# Patient Record
Sex: FEMALE | Race: WHITE
[De-identification: names, ages, dates, MRNs, and addresses within clinical notes are randomized per-mention and may not be internally consistent; named-entity substitution may affect disease eponyms.]

---

## 2017-02-26 ENCOUNTER — HOSPITAL ENCOUNTER (EMERGENCY)
Dept: HOSPITAL 25 - UCCORT | Age: 22
Discharge: HOME | End: 2017-02-26
Payer: COMMERCIAL

## 2017-02-26 VITALS — DIASTOLIC BLOOD PRESSURE: 70 MMHG | SYSTOLIC BLOOD PRESSURE: 119 MMHG

## 2017-02-26 DIAGNOSIS — R53.83: ICD-10-CM

## 2017-02-26 DIAGNOSIS — R50.9: ICD-10-CM

## 2017-02-26 DIAGNOSIS — R09.81: ICD-10-CM

## 2017-02-26 DIAGNOSIS — B34.9: Primary | ICD-10-CM

## 2017-02-26 DIAGNOSIS — Z91.02: ICD-10-CM

## 2017-02-26 PROCEDURE — 99201: CPT

## 2017-02-26 PROCEDURE — G0463 HOSPITAL OUTPT CLINIC VISIT: HCPCS

## 2017-02-26 PROCEDURE — 87502 INFLUENZA DNA AMP PROBE: CPT

## 2017-02-26 PROCEDURE — 87651 STREP A DNA AMP PROBE: CPT

## 2017-02-26 NOTE — UC
FLU HPI





- HPI Summary


HPI Summary: 





Pt presents with c/o generalized body aches, cough, nasal congestion, sore 

throat , coughing up "bloody phlegm" X 3 days. 





- History of Current Complaint


Chief Complaint: UCRespiratory


Stated Complaint: ST/FEVER/HEADACHE


Time Seen by Provider: 02/26/17 08:12


Hx Obtained From: Patient


Hx Last Menstrual Period: 2.5 weeks


Pregnant?: No


Onset/Duration: Sudden Onset, Lasting Days


Severity Currently: Mild


Severity Initially: Mild


Associated Signs & Symptoms: Positive: Fever, Myalgia, Cough, Sore Throat, 

Nasal Congestion, Headache





- Risk Factors


Influenza Risk Factors: Negative





- Allergy/Home Medications


Allergies/Adverse Reactions: 


 Allergies











Allergy/AdvReac Type Severity Reaction Status Date / Time


 


gluten Allergy  Abdominal Uncoded 02/26/17 07:49





   Pain  











Home Medications: 


 Home Medications





Drospirenone-Ethinyl Estradiol [Vestura] 1 tab PO DAILY 02/26/17 [History 

Confirmed 02/26/17]


Otc Cough Med 1 dose PO ONCE PRN 02/26/17 [History]


Phenylephrine-Doxylamine-Dextr [Nyquil Severe Cold/Flu 5-6.25- mg/15Ml] 1 

liq PO BEDTIME PRN 02/26/17 [History Confirmed 02/26/17]











PMH/Surg Hx/FS Hx/Imm Hx


Previously Healthy: Yes





- Surgical History


Surgical History: Yes


Surgery Procedure, Year, and Place: wisdom teeth





- Family History


Known Family History: Positive: Other - positive [facility] for viral disease





- Social History


Occupation: Student


Alcohol Use: Occasionally


Substance Use Type: None


Smoking Status (MU): Never Smoked Tobacco





Review of Systems


Constitutional: Fever, Chills, Fatigue


Skin: Negative


Eyes: Negative


ENT: Sore Throat


Respiratory: Cough


Cardiovascular: Negative


Gastrointestinal: Negative


Genitourinary: Negative


Motor: Negative


Neurovascular: Negative


Musculoskeletal: Myalgia


Neurological: Headache


Psychological: Negative


All Other Systems Reviewed And Are Negative: Yes





Physical Exam


Triage Information Reviewed: Yes


Appearance: Ill-Appearing


Vital Signs: 


 Initial Vital Signs











Temp  98.7 F   02/26/17 07:52


 


Pulse  97   02/26/17 07:52


 


Resp  18   02/26/17 07:52


 


BP  119/70   02/26/17 07:52


 


Pulse Ox  100   02/26/17 07:52











Vital Signs Reviewed: Yes


Eye Exam: Normal


ENT Exam: Other


ENT: Positive: Tonsillar swelling


Dental Exam: Normal


Neck exam: Normal


Respiratory Exam: Normal


Cardiovascular Exam: Normal


Musculoskeletal Exam: Normal


Neurological Exam: Normal


Psychological Exam: Normal


Skin Exam: Normal





Flu Course/Dx





- Differential Dx/Diagnosis


Differential Diagnosis/HQI/PQRI: Bronchitis, Influenza, Upper Respiratory 

Infection, Other - mono,


Provider Diagnoses: Viral syndrome





Discharge





- Discharge Plan


Condition: Stable


Disposition: HOME


Patient Education Materials:  Viral Syndrome (ED)


Forms:  *School Release


Referrals: 


[facility] [place] PHYSICIAN REFERRAL [Outside]

## 2017-04-25 ENCOUNTER — HOSPITAL ENCOUNTER (EMERGENCY)
Dept: HOSPITAL 25 - UCCORT | Age: 22
Discharge: HOME | End: 2017-04-25
Payer: COMMERCIAL

## 2017-04-25 VITALS — DIASTOLIC BLOOD PRESSURE: 55 MMHG | SYSTOLIC BLOOD PRESSURE: 110 MMHG

## 2017-04-25 DIAGNOSIS — J03.00: Primary | ICD-10-CM

## 2017-04-25 PROCEDURE — 99212 OFFICE O/P EST SF 10 MIN: CPT

## 2017-04-25 PROCEDURE — G0463 HOSPITAL OUTPT CLINIC VISIT: HCPCS

## 2017-04-25 PROCEDURE — 87651 STREP A DNA AMP PROBE: CPT

## 2017-04-25 NOTE — UC
Throat Pain/Nasal Leonel HPI





- HPI Summary


HPI Summary: 





complaint of sore throat that started yetsrday


 this morning when she woke up she could hardly swallow


 has felt feverish but hasn't checked her temp


slight nasal congestion denies cough


denies headache and ear pain


took skin infection but only took 2 doses


has been taking tylenol without much relief





- History of Current Complaint


Chief Complaint: UCGeneralIllness


Stated Complaint: SWOLLEN THROAT/ST/REDNESS


Time Seen by Provider: 04/25/17 18:11


Hx Obtained From: Patient


Hx Last Menstrual Period: 3 wks ago





- Allergies/Home Medications


Allergies/Adverse Reactions: 


 Allergies











Allergy/AdvReac Type Severity Reaction Status Date / Time


 


gluten Allergy  Abdominal Uncoded 04/25/17 18:04





   Pain  











Home Medications: 


 Home Medications





ALPRAZolam TAB* [Xanax TAB*] 0.25 mg PO DAILY PRN 04/25/17 [History Confirmed 04 /25/17]


Azithromycin TAB* [Zithromax TAB (Z-TAQUERIA) 250 mg #6 tabs] 250 mg PO DAILY 04/25/ 17 [History Confirmed 04/25/17]


Lisdexamfetamine Dimesylate [Vyvanse] 20 mg PO DAILY 04/25/17 [History 

Confirmed 04/25/17]











PMH/Surg Hx/FS Hx/Imm Hx


Previously Healthy: Yes





- Surgical History


Surgical History: Yes


Surgery Procedure, Year, and Place: wisdom teeth





- Family History


Known Family History: Positive: Other - positive [facility] for viral disease


   Negative: Cardiac Disease, Hypertension, Diabetes





- Social History


Occupation: Student


Alcohol Use: Occasionally


Substance Use Type: None


Smoking Status (MU): Never Smoked Tobacco





Review of Systems


Constitutional: Negative


Skin: Negative


Eyes: Negative


ENT: Sore Throat, Nasal Discharge


Respiratory: Negative


Cardiovascular: Negative


Gastrointestinal: Negative


Genitourinary: Negative


Motor: Negative


Neurovascular: Negative


Musculoskeletal: Negative


Neurological: Negative


Psychological: Negative


All Other Systems Reviewed And Are Negative: Yes





Physical Exam


Triage Information Reviewed: Yes


Appearance: No Pain Distress, Well-Nourished


Vital Signs: 


 Initial Vital Signs











Temp  98.7 F   04/25/17 18:06


 


Pulse  78   04/25/17 18:06


 


Resp  16   04/25/17 18:06


 


BP  110/55   04/25/17 18:06


 


Pulse Ox  100   04/25/17 18:06











Vital Signs Reviewed: Yes


Eyes: Positive: Conjunctiva Clear


ENT: Positive: Pharyngeal erythema, Nasal congestion, Nasal drainage, TMs normal

, Tonsillar swelling, Tonsillar exudate


Dental: Positive: Cervical Lymphadenopathy


Respiratory: Positive: Lungs clear, Normal breath sounds, No respiratory 

distress, No accessory muscle use


Cardiovascular: Positive: RRR, No Murmur, Pulses Normal


Abdomen Description: Positive: Nontender, Soft


Bowel Sounds: Positive: Present


Musculoskeletal Exam: Normal


Neurological Exam: Normal


Psychological Exam: Normal


Skin Exam: Normal





Throat Pain/Nasal Course/Dx





- Differential Dx/Diagnosis


Differential Diagnosis/HQI/PQRI: Pharyngitis, Tonsillitis


Provider Diagnoses: tonsilitis-strep





Discharge





- Discharge Plan


Condition: Stable


Disposition: HOME


Prescriptions: 


Penicillin VK  MG(NF) [Penicillin  mg Tab(NF)] 500 mg PO TID #30 

tab


Patient Education Materials:  Strep Throat (ED)


Referrals: 


Non Staff,Doctor [Primary Care Provider] - 


Additional Instructions: 




















Please take antibiotic as directed


Increase fluids and rest


Take acetaminophen or ibuprofen for fever or pain


Please review your discharge instructions. 


If your symptoms do not improve please call your primary care provider or 

return to urgent care.

## 2017-09-28 ENCOUNTER — HOSPITAL ENCOUNTER (EMERGENCY)
Dept: HOSPITAL 25 - UCCORT | Age: 22
Discharge: HOME | End: 2017-09-28
Payer: COMMERCIAL

## 2017-09-28 VITALS — SYSTOLIC BLOOD PRESSURE: 118 MMHG | DIASTOLIC BLOOD PRESSURE: 56 MMHG

## 2017-09-28 DIAGNOSIS — H10.9: Primary | ICD-10-CM

## 2017-09-28 PROCEDURE — G0463 HOSPITAL OUTPT CLINIC VISIT: HCPCS

## 2017-09-28 PROCEDURE — 99212 OFFICE O/P EST SF 10 MIN: CPT

## 2017-09-28 NOTE — UC
Eye Complaint HPI





- HPI Summary


HPI Summary: 





Pt c/o left eye redness and green drainage, X 1 day.   





- History of Current Complaint


Chief Complaint: UCEye


Stated Complaint: LEFT EYE COMPLAINT


Time Seen by Provider: 09/28/17 17:18


Hx Obtained From: Patient


Hx Last Menstrual Period: 9/14/17


Pregnant?: No


Onset/Duration: Sudden Onset


Timing: Constant


Severity Initially: Mild


Severity Currently: Mild


Pain Intensity: 0


Pain Scale Used: 0-10 Numeric


Location of Injury: Conjunctiva


Aggravating Factor(s): Light


Alleviating Factor(s): Darkness


Associated Signs And Symptoms: Positive: Drainage (Purulent)





- Risk Factors


Acute Glaucoma Risk Factors: Negative


Optic Artery Occlusion Risk Factors: Negative





- Allergies/Home Medications


Allergies/Adverse Reactions: 


 Allergies











Allergy/AdvReac Type Severity Reaction Status Date / Time


 


gluten Allergy  Abdominal Uncoded 09/28/17 16:44





   Pain  














PMH/Surg Hx/FS Hx/Imm Hx


Previously Healthy: Yes





- Surgical History


Surgical History: Yes


Surgery Procedure, Year, and Place: wisdom teeth





- Family History


Known Family History: Positive: Other - positive [facility] for viral disease


   Negative: Cardiac Disease, Hypertension, Diabetes





- Social History


Occupation: Student [employer]


Lives: With Family


Alcohol Use: Weekly


Substance Use Type: None


Smoking Status (MU): Never Smoked Tobacco


Have You Smoked in the Last Year: No





Review of Systems


Constitutional: Negative


Skin: Negative


Eyes: Drainage - green, Eye Redness


ENT: Negative


Respiratory: Negative


Cardiovascular: Negative


Gastrointestinal: Negative


Genitourinary: Negative


Motor: Negative


Neurovascular: Negative


Musculoskeletal: Negative


Neurological: Negative


Psychological: Negative


Is Patient Immunocompromised?: No


All Other Systems Reviewed And Are Negative: Yes





Physical Exam


Triage Information Reviewed: Yes


Appearance: Well-Appearing


Vital Signs: 


 Initial Vital Signs











Temp  98.3 F   09/28/17 16:45


 


Pulse  92   09/28/17 16:45


 


Resp  18   09/28/17 16:45


 


BP  118/56   09/28/17 16:45


 


Pulse Ox  100   09/28/17 16:45











Vital Signs Reviewed: Yes


Eyes: Positive: Conjunctiva Inflamed, Discharge - yellow


ENT Exam: Normal


Dental Exam: Normal


Neck exam: Normal


Respiratory Exam: Normal


Cardiovascular Exam: Normal


Musculoskeletal Exam: Normal


Neurological Exam: Normal


Psychological Exam: Normal


Skin Exam: Normal





Eye Complaint Course/Dx





- Differential Dx/Diagnosis


Differential Diagnosis/HQI/PQRI: Conjunctivitis


Provider Diagnoses: conjunctivitis left eye





Discharge





- Discharge Plan


Condition: Stable


Disposition: HOME


Prescriptions: 


Polymyx/Trimethoprim OPTH* [Polytrim OPHTH*] 2 drop BOTH EYES Q8H #1 btl


Patient Education Materials:  Conjunctivitis (ED)


Referrals: 


[facility] [place] PHYSICIAN REFERRAL [Outside]


Non Staff,Doctor [Primary Care Provider] - If Needed

## 2017-11-13 ENCOUNTER — HOSPITAL ENCOUNTER (EMERGENCY)
Dept: HOSPITAL 25 - UCCORT | Age: 22
Discharge: LEFT BEFORE DISCHARGE | End: 2017-11-13
Payer: COMMERCIAL

## 2017-11-13 DIAGNOSIS — Z53.21: ICD-10-CM

## 2017-11-13 DIAGNOSIS — L98.9: Primary | ICD-10-CM

## 2018-02-13 ENCOUNTER — HOSPITAL ENCOUNTER (EMERGENCY)
Dept: HOSPITAL 25 - UCCORT | Age: 23
Discharge: HOME | End: 2018-02-13
Payer: COMMERCIAL

## 2018-02-13 VITALS — SYSTOLIC BLOOD PRESSURE: 133 MMHG | DIASTOLIC BLOOD PRESSURE: 62 MMHG

## 2018-02-13 DIAGNOSIS — M62.830: Primary | ICD-10-CM

## 2018-02-13 PROCEDURE — 71046 X-RAY EXAM CHEST 2 VIEWS: CPT

## 2018-02-13 PROCEDURE — G0463 HOSPITAL OUTPT CLINIC VISIT: HCPCS

## 2018-02-13 PROCEDURE — 72070 X-RAY EXAM THORAC SPINE 2VWS: CPT

## 2018-02-13 PROCEDURE — 99212 OFFICE O/P EST SF 10 MIN: CPT

## 2018-02-13 NOTE — RAD
HISTORY: Upper back pain



COMPARISONS: None



VIEWS: 2, Frontal and lateral views of the thoracic spine.



FINDINGS:



ALIGNMENT:  The alignment is normal.

VERTEBRAL BODIES:  The vertebral body heights are normal. The interpedicular distances are

normal.

JOINTS:  Unremarkable.

INTERVERTEBRAL DISCS:  The intervertebral disc heights are normal.

SOFT TISSUE: Unremarkable



OTHER:  The visualized lungs are clear.



IMPRESSION: 

UNREMARKABLE RADIOGRAPHS OF THE THORACIC SPINE

## 2018-02-13 NOTE — RAD
HISTORY: Back pain



COMPARISONS: None



VIEWS: 4: Frontal dual-energy and lateral views of the chest.



FINDINGS:

CARDIOMEDIASTINAL SILHOUETTE: The cardiomediastinal silhouette is normal.

FRANK: The frank are normal.

PLEURA: The costophrenic angles are sharp. No pleural abnormalities are noted.

LUNG PARENCHYMA: The lungs are clear.

ABDOMEN: The upper abdomen is clear. There is no subphrenic gas.

BONES AND SOFT TISSUES: No bone or soft tissue abnormalities are noted.

OTHER: None.



IMPRESSION:

NO ACTIVE CARDIOPULMONARY DISEASE.

## 2018-02-13 NOTE — UC
Back Pain HPI





- HPI Summary


HPI Summary: 





23 y/o female with 2 week h/o back pain, denies neck pain, fever, chills, body 

aches, trauma.  no insinuating event, no prior occurance.   Patient states 

tried fathers muscle relaxer which made her fall asleep, no problems with 

sleeping at night, no cough.   patient is concerned as aunt had lung cancer and 

presented with similar symptoms, anxious.  denies neurological symptoms, 

weakness  





- History of Current Complaint


Chief Complaint: UCUpperExtremity


Stated Complaint: NECK PAIN/TENSION


Time Seen by Provider: 02/13/18 12:28


Hx Obtained From: Patient


Hx Last Menstrual Period: 2/12/18


Onset/Duration: Sudden Onset, Lasting Weeks


Timing: Constant


Severity Initially: Moderate


Severity Currently: Moderate


Pain Intensity: 4


Pain Scale Used: 0-10 Numeric





- Allergies/Home Medications


Allergies/Adverse Reactions: 


 Allergies











Allergy/AdvReac Type Severity Reaction Status Date / Time


 


gluten Allergy  Abdominal Uncoded 02/13/18 12:05





   Pain  











Home Medications: 


 Home Medications





ALPRAZolam [Alprazolam] 1 mg PO DAILY 02/13/18 [History Confirmed 02/13/18]


Lisdexamfetamine Dimesylate [Vyvanse] 1 mg PO 02/13/18 [History]











PMH/Surg Hx/FS Hx/Imm Hx


Previously Healthy: Yes





- Surgical History


Surgical History: Yes


Surgery Procedure, Year, and Place: wisdom teeth





- Family History


Known Family History: Positive: Other - positive [facility] for viral disease


   Negative: Cardiac Disease, Hypertension, Diabetes





- Social History


Alcohol Use: Weekly


Substance Use Type: None


Smoking Status (MU): Never Smoked Tobacco


Have You Smoked in the Last Year: No





Review of Systems


Musculoskeletal: Decreased ROM, Myalgia


Psychological: Anxious


Is Patient Immunocompromised?: No


All Other Systems Reviewed And Are Negative: Yes





Physical Exam


Triage Information Reviewed: Yes


Appearance: Well-Appearing, No Pain Distress, Well-Nourished


Vital Signs: 


 Initial Vital Signs











Temp  98.7 F   02/13/18 12:07


 


Pulse  94   02/13/18 12:07


 


Resp  18   02/13/18 12:07


 


BP  133/62   02/13/18 12:07


 


Pulse Ox  99   02/13/18 12:07











Vital Signs Reviewed: Yes


Eyes: Positive: Conjunctiva Clear


Neck: Positive: Supple, Nontender, No Lymphadenopathy


Respiratory: Positive: Chest non-tender, Lungs clear, Normal breath sounds, No 

respiratory distress, No accessory muscle use


Musculoskeletal: Positive: Strength Intact, ROM Intact, Other: - tenderness 

over paraspinal muscle thoracic region, full ROM of shoudler b/l, uses both 

arms equally without difficulty, no tenderness over spinal cervical, thoracic, 

lumbar.   + trap tendreness b/l.


Neurological Exam: Normal


Psychological Exam: Normal


Skin Exam: Normal





Back Pain Course/Dx





- Course


Course Of Treatment: radiograph negative, likely muscle spasm, lidoderm patch, 

given, motrin/ naproxen x 4 days, follow up with pcp if no improvement





- Differential Dx/Diagnosis


Provider Diagnoses: muscle spasm, upper back





Discharge





- Discharge Plan


Condition: Good


Disposition: HOME


Prescriptions: 


Lidocaine PATCH 5%* [Lidoderm 5% Patch*] 1 patch TRANSDERM DAILY PRN #10 patch


 PRN Reason: muscle pain 


Patient Education Materials:  Muscle Spasm (ED), Back Pain (ED)


Referrals: 


Non Staff,Doctor [Primary Care Provider] - 


Additional Instructions: 


- Increase stretching 


- lidoderm patch 12 hours on, 12 hours off for pain 


- Motrin 400mg every 6 hours or naproxen twice daily x 4 days to decrease 

swelling 


- Increase fluid intake

## 2019-03-15 ENCOUNTER — HOSPITAL ENCOUNTER (EMERGENCY)
Dept: HOSPITAL 25 - UCCORT | Age: 24
Discharge: HOME | End: 2019-03-15
Payer: COMMERCIAL

## 2019-03-15 VITALS — SYSTOLIC BLOOD PRESSURE: 119 MMHG | DIASTOLIC BLOOD PRESSURE: 82 MMHG

## 2019-03-15 DIAGNOSIS — H92.03: ICD-10-CM

## 2019-03-15 DIAGNOSIS — J06.9: Primary | ICD-10-CM

## 2019-03-15 DIAGNOSIS — Z91.018: ICD-10-CM

## 2019-03-15 PROCEDURE — 99211 OFF/OP EST MAY X REQ PHY/QHP: CPT

## 2019-03-15 PROCEDURE — G0463 HOSPITAL OUTPT CLINIC VISIT: HCPCS

## 2019-03-15 NOTE — UC
Throat Pain/Nasal Leonel HPI





- HPI Summary


HPI Summary: 





nasal congestion , cough x 1 days


bilateral ear pain, sore throat


no fever, + chills 





- History of Current Complaint


Chief Complaint: UCGeneralIllness


Stated Complaint: UPPER RESPIRATORY CONCERNS


Time Seen by Provider: 03/15/19 10:20


Hx Obtained From: Patient


Hx Last Menstrual Period: 03/01/19


Pregnant?: No


Onset/Duration: Gradual Onset, Lasting Days - 1, Still Present


Severity: Moderate


Pain Intensity: 0


Cough: Nonproductive


Associated Signs & Symptoms: Positive: Nasal Discharge.  Negative: Wheezing, 

Hoarseness, Sinus Discomfort, Fever





- Allergies/Home Medications


Allergies/Adverse Reactions: 


 Allergies











Allergy/AdvReac Type Severity Reaction Status Date / Time


 


gluten Allergy  Abdominal Uncoded 03/15/19 10:21





   Pain  














PMH/Surg Hx/FS Hx/Imm Hx


Previously Healthy: Yes





- Surgical History


Surgical History: Yes


Surgery Procedure, Year, and Place: wisdom teeth





- Family History


Known Family History: Positive: Other - positive [facility] for viral disease


   Negative: Cardiac Disease, Hypertension, Diabetes





- Social History


Alcohol Use: Weekly


Substance Use Type: None


Smoking Status (MU): Never Smoked Tobacco


Have You Smoked in the Last Year: No





Review of Systems


All Other Systems Reviewed And Are Negative: Yes


Constitutional: Positive: Chills, Fatigue


Skin: Positive: Negative


Eyes: Positive: Negative


ENT: Positive: Sore Throat, Ear Ache, Nasal Discharge, Sinus Congestion, Sinus 

Pain/Tenderness


Respiratory: Positive: Cough


Cardiovascular: Positive: Negative


Is Patient Immunocompromised?: No





Physical Exam


Triage Information Reviewed: Yes


Appearance: Well-Appearing, No Pain Distress, Well-Nourished


Vital Signs: 


 Initial Vital Signs











Temp  98.5 F   03/15/19 10:20


 


Pulse  86   03/15/19 10:20


 


Resp  15   03/15/19 10:20


 


BP  119/82   03/15/19 10:20


 


Pulse Ox  100   03/15/19 10:20











Vital Signs Reviewed: Yes


Eye Exam: Normal


ENT: Positive: Normal ENT inspection, Hearing grossly normal, Pharynx normal, 

Nasal congestion, TMs normal.  Negative: TM bulging, TM dull, TM red


Neck: Positive: Supple, Nontender, No Lymphadenopathy


Respiratory: Positive: Chest non-tender, Lungs clear, Normal breath sounds


Cardiovascular: Positive: RRR, No Murmur, Pulses Normal


Skin Exam: Normal





Throat Pain/Nasal Course/Dx





- Differential Dx/Diagnosis


Provider Diagnosis: 


 URI, acute








Discharge





- Sign-Out/Discharge


Documenting (check all that apply): Patient Departure


All imaging exams completed and their final reports reviewed: No Studies





- Discharge Plan


Condition: Stable


Disposition: HOME


Patient Education Materials:  Upper Respiratory Infection (DC)


Referrals: 


Non Staff,Doctor [Primary Care Provider] - If Needed





- Billing Disposition and Condition


Condition: STABLE


Disposition: Home